# Patient Record
Sex: FEMALE | Employment: UNEMPLOYED | URBAN - METROPOLITAN AREA
[De-identification: names, ages, dates, MRNs, and addresses within clinical notes are randomized per-mention and may not be internally consistent; named-entity substitution may affect disease eponyms.]

---

## 2021-02-23 LAB
HEP B, EXTERNAL RESULT: NON REACTIVE
HEPATITIS C ANTIBODY, EXTERNAL RESULT: NON REACTIVE
HIV, EXTERNAL RESULT: NON REACTIVE
RPR, EXTERNAL RESULT: NON REACTIVE
RUBELLA TITER, EXTERNAL RESULT: NORMAL

## 2021-09-23 ENCOUNTER — HOSPITAL ENCOUNTER (OUTPATIENT)
Age: 21
Discharge: HOME | End: 2021-09-23
Attending: OBSTETRICS & GYNECOLOGY | Admitting: OBSTETRICS & GYNECOLOGY
Payer: COMMERCIAL

## 2021-09-23 VITALS
BODY MASS INDEX: 41.41 KG/M2 | TEMPERATURE: 98.7 F | HEIGHT: 62 IN | DIASTOLIC BLOOD PRESSURE: 78 MMHG | WEIGHT: 225 LBS | RESPIRATION RATE: 16 BRPM | SYSTOLIC BLOOD PRESSURE: 136 MMHG | HEART RATE: 86 BPM

## 2021-09-23 PROCEDURE — 99211 OFF/OP EST MAY X REQ PHY/QHP: CPT

## 2021-09-23 NOTE — H&P
Department of Obstetrics and Gynecology  Labor and Delivery Triage Note        CHIEF COMPLAINT:  Moved from [place] to [place] and has not established care. Presents to triage because \"today is my due date and I wanted to check on my baby\"      HISTORY OF PRESENT ILLNESS:      The patient is a 24 y.o. female 37w0d. OB History        1    Para        Term                AB        Living           SAB        TAB        Ectopic        Molar        Multiple        Live Births                  Patient presents with a chief complaint as above. Patient had prenatal care up to 22 weeks through [facility]. [place] by 1st trimester US (7 wk) = 2021    Estimated Due Date:  Estimated Date of Delivery: 21    PAST MEDICAL HISTORY:   Past Medical History:   Diagnosis Date    Mental disorder        PAST  SURGICAL HISTORY:   Past Surgical History:   Procedure Laterality Date    CHOLECYSTECTOMY      TONSILLECTOMY         SOCIAL HISTORY:     reports that she has quit smoking. She has never used smokeless tobacco. She reports previous alcohol use. She reports previous drug use. MEDICATIONS:    Prior to Admission medications    Not on File        PRENATAL CARE:    Complicated by: no prenatal care since 22 weeks. REVIEW OF SYSTEMS:     Pertinent items are noted in HPI. PHYSICAL EXAM:    Vital Signs:   Vitals:    21 1533   BP: 120/73   Pulse: 96   Resp: 16   Temp: 98.7 °F (37.1 °C)       Cat 1 tracing    Contraction frequency:  0 minutes    Membranes:  Intact    Cervix: closed/50/-2      ASSESSMENT? PLAN:    There is no problem list on file for this patient. 24 y.o. female 37w0d. OB History        1    Para        Term                AB        Living           SAB        TAB        Ectopic        Molar        Multiple        Live Births                1. FWB reassuring. 2.  Term pregnancy, no provider. Reviewed Labor warnings and kick counts reviewed.  If patient has decreased fetal movement, leakage of fluid, bleeding or contractions, recommend returning to triage for evaluation. Patient reports understanding she will be assigned to a provider upon arrival.    3. Post date counseling performed. Recommend delivery prior to 42 weeks. If patient has not gone into spontaneous labor by 9/1, return to hospital for evaluation, induction.      Amee Seth MD  9/23/2021

## 2021-09-23 NOTE — PROGRESS NOTES
Pt sent here by office for decreased fetal movement and episode of feeling like she was going to pass out from fast heart rate stated by patient.

## 2021-10-01 ENCOUNTER — HOSPITAL ENCOUNTER (INPATIENT)
Age: 21
LOS: 3 days | Discharge: HOME | DRG: 540 | End: 2021-10-04
Attending: STUDENT IN AN ORGANIZED HEALTH CARE EDUCATION/TRAINING PROGRAM | Admitting: STUDENT IN AN ORGANIZED HEALTH CARE EDUCATION/TRAINING PROGRAM
Payer: COMMERCIAL

## 2021-10-01 PROBLEM — Z34.90 INTRAUTERINE PREGNANCY: Status: ACTIVE | Noted: 2021-10-01

## 2021-10-01 LAB
ABO/RH: NORMAL
AMPHETAMINE SCREEN, URINE: ABNORMAL
ANTIBODY SCREEN: NORMAL
BARBITURATE SCREEN URINE: ABNORMAL
BASOPHILS ABSOLUTE: 0 K/UL (ref 0–0.2)
BASOPHILS RELATIVE PERCENT: 0.2 %
BENZODIAZEPINE SCREEN, URINE: ABNORMAL
BUPRENORPHINE URINE: ABNORMAL
CANNABINOID SCREEN URINE: POSITIVE
COCAINE METABOLITE SCREEN URINE: ABNORMAL
EOSINOPHILS ABSOLUTE: 0.1 K/UL (ref 0–0.6)
EOSINOPHILS RELATIVE PERCENT: 0.5 %
HCT VFR BLD CALC: 40.1 % (ref 36–48)
HEMOGLOBIN: 13.5 G/DL (ref 12–16)
LYMPHOCYTES ABSOLUTE: 2.7 K/UL (ref 1–5.1)
LYMPHOCYTES RELATIVE PERCENT: 19.5 %
Lab: ABNORMAL
MCH RBC QN AUTO: 28.3 PG (ref 26–34)
MCHC RBC AUTO-ENTMCNC: 33.6 G/DL (ref 31–36)
MCV RBC AUTO: 84.3 FL (ref 80–100)
METHADONE SCREEN, URINE: ABNORMAL
MONOCYTES ABSOLUTE: 0.8 K/UL (ref 0–1.3)
MONOCYTES RELATIVE PERCENT: 6.1 %
NEUTROPHILS ABSOLUTE: 10.2 K/UL (ref 1.7–7.7)
NEUTROPHILS RELATIVE PERCENT: 73.7 %
OPIATE SCREEN URINE: ABNORMAL
OXYCODONE URINE: ABNORMAL
PDW BLD-RTO: 15.1 % (ref 12.4–15.4)
PH UA: 6
PHENCYCLIDINE SCREEN URINE: ABNORMAL
PLATELET # BLD: 262 K/UL (ref 135–450)
PMV BLD AUTO: 9 FL (ref 5–10.5)
PROPOXYPHENE SCREEN: ABNORMAL
RBC # BLD: 4.76 M/UL (ref 4–5.2)
SARS-COV-2, NAAT: NOT DETECTED
WBC # BLD: 13.8 K/UL (ref 4–11)

## 2021-10-01 PROCEDURE — 1220000000 HC SEMI PRIVATE OB R&B

## 2021-10-01 PROCEDURE — 86780 TREPONEMA PALLIDUM: CPT

## 2021-10-01 PROCEDURE — 80307 DRUG TEST PRSMV CHEM ANLYZR: CPT

## 2021-10-01 PROCEDURE — 6360000002 HC RX W HCPCS

## 2021-10-01 PROCEDURE — 86900 BLOOD TYPING SEROLOGIC ABO: CPT

## 2021-10-01 PROCEDURE — 86850 RBC ANTIBODY SCREEN: CPT

## 2021-10-01 PROCEDURE — 6360000002 HC RX W HCPCS: Performed by: STUDENT IN AN ORGANIZED HEALTH CARE EDUCATION/TRAINING PROGRAM

## 2021-10-01 PROCEDURE — 86901 BLOOD TYPING SEROLOGIC RH(D): CPT

## 2021-10-01 PROCEDURE — 87635 SARS-COV-2 COVID-19 AMP PRB: CPT

## 2021-10-01 PROCEDURE — 2580000003 HC RX 258: Performed by: STUDENT IN AN ORGANIZED HEALTH CARE EDUCATION/TRAINING PROGRAM

## 2021-10-01 PROCEDURE — 85025 COMPLETE CBC W/AUTO DIFF WBC: CPT

## 2021-10-01 PROCEDURE — 2500000003 HC RX 250 WO HCPCS

## 2021-10-01 RX ORDER — SODIUM CHLORIDE 0.9 % (FLUSH) 0.9 %
5-40 SYRINGE (ML) INJECTION PRN
Status: DISCONTINUED | OUTPATIENT
Start: 2021-10-01 | End: 2021-10-02

## 2021-10-01 RX ORDER — ONDANSETRON 2 MG/ML
4 INJECTION INTRAMUSCULAR; INTRAVENOUS EVERY 6 HOURS PRN
Status: DISCONTINUED | OUTPATIENT
Start: 2021-10-01 | End: 2021-10-02

## 2021-10-01 RX ORDER — PROMETHAZINE HYDROCHLORIDE 25 MG/ML
INJECTION, SOLUTION INTRAMUSCULAR; INTRAVENOUS
Status: COMPLETED
Start: 2021-10-01 | End: 2021-10-01

## 2021-10-01 RX ORDER — BUTORPHANOL TARTRATE 1 MG/ML
1 INJECTION, SOLUTION INTRAMUSCULAR; INTRAVENOUS
Status: DISCONTINUED | OUTPATIENT
Start: 2021-10-01 | End: 2021-10-02

## 2021-10-01 RX ORDER — DOCUSATE SODIUM 100 MG/1
100 CAPSULE, LIQUID FILLED ORAL 2 TIMES DAILY
Status: DISCONTINUED | OUTPATIENT
Start: 2021-10-01 | End: 2021-10-02

## 2021-10-01 RX ORDER — TERBUTALINE SULFATE 1 MG/ML
0.25 INJECTION, SOLUTION SUBCUTANEOUS ONCE
Status: COMPLETED | OUTPATIENT
Start: 2021-10-01 | End: 2021-10-02

## 2021-10-01 RX ORDER — PROMETHAZINE HYDROCHLORIDE 25 MG/ML
12.5 INJECTION, SOLUTION INTRAMUSCULAR; INTRAVENOUS EVERY 6 HOURS PRN
Status: DISCONTINUED | OUTPATIENT
Start: 2021-10-01 | End: 2021-10-01

## 2021-10-01 RX ORDER — SODIUM CHLORIDE 0.9 % (FLUSH) 0.9 %
5-40 SYRINGE (ML) INJECTION EVERY 12 HOURS SCHEDULED
Status: DISCONTINUED | OUTPATIENT
Start: 2021-10-01 | End: 2021-10-02

## 2021-10-01 RX ORDER — SODIUM CHLORIDE, SODIUM LACTATE, POTASSIUM CHLORIDE, CALCIUM CHLORIDE 600; 310; 30; 20 MG/100ML; MG/100ML; MG/100ML; MG/100ML
INJECTION, SOLUTION INTRAVENOUS CONTINUOUS
Status: DISCONTINUED | OUTPATIENT
Start: 2021-10-01 | End: 2021-10-02

## 2021-10-01 RX ORDER — SODIUM CHLORIDE, SODIUM LACTATE, POTASSIUM CHLORIDE, AND CALCIUM CHLORIDE .6; .31; .03; .02 G/100ML; G/100ML; G/100ML; G/100ML
1000 INJECTION, SOLUTION INTRAVENOUS PRN
Status: DISCONTINUED | OUTPATIENT
Start: 2021-10-01 | End: 2021-10-02

## 2021-10-01 RX ORDER — SODIUM CHLORIDE, SODIUM LACTATE, POTASSIUM CHLORIDE, AND CALCIUM CHLORIDE .6; .31; .03; .02 G/100ML; G/100ML; G/100ML; G/100ML
500 INJECTION, SOLUTION INTRAVENOUS PRN
Status: DISCONTINUED | OUTPATIENT
Start: 2021-10-01 | End: 2021-10-02

## 2021-10-01 RX ORDER — PROMETHAZINE HYDROCHLORIDE 25 MG/ML
12.5 INJECTION, SOLUTION INTRAMUSCULAR; INTRAVENOUS EVERY 6 HOURS PRN
Status: DISCONTINUED | OUTPATIENT
Start: 2021-10-01 | End: 2021-10-02

## 2021-10-01 RX ORDER — SODIUM CHLORIDE 9 MG/ML
25 INJECTION, SOLUTION INTRAVENOUS PRN
Status: DISCONTINUED | OUTPATIENT
Start: 2021-10-01 | End: 2021-10-02

## 2021-10-01 RX ADMIN — PROMETHAZINE HYDROCHLORIDE 12.5 MG: 25 INJECTION INTRAMUSCULAR; INTRAVENOUS at 19:52

## 2021-10-01 RX ADMIN — SODIUM CHLORIDE, POTASSIUM CHLORIDE, SODIUM LACTATE AND CALCIUM CHLORIDE: 600; 310; 30; 20 INJECTION, SOLUTION INTRAVENOUS at 16:43

## 2021-10-01 RX ADMIN — Medication 1 MILLI-UNITS/MIN: at 20:10

## 2021-10-01 RX ADMIN — DEXTROSE MONOHYDRATE 5 MILLION UNITS: 5 INJECTION INTRAVENOUS at 16:45

## 2021-10-01 RX ADMIN — FAMOTIDINE 20 MG: 10 INJECTION, SOLUTION INTRAVENOUS at 19:50

## 2021-10-01 RX ADMIN — Medication 2.5 MILLION UNITS: at 21:25

## 2021-10-01 RX ADMIN — BUTORPHANOL TARTRATE 1 MG: 1 INJECTION, SOLUTION INTRAMUSCULAR; INTRAVENOUS at 22:01

## 2021-10-01 ASSESSMENT — PAIN DESCRIPTION - DESCRIPTORS
DESCRIPTORS: CRAMPING

## 2021-10-01 ASSESSMENT — PAIN SCALES - GENERAL: PAINLEVEL_OUTOF10: 9

## 2021-10-01 NOTE — PROGRESS NOTES
Pt arrived to triage with c/o contractions and that when she was here back on 9/23, the physician told her that if she was not delivered by 10/1 to come to triage for induction. Pt has not had any prenatal care since 22 weeks due to moving from [place] and unable to get her records transferred. Pt states baby has been active, denies vaginal bleeding or gushes of fluid.

## 2021-10-01 NOTE — H&P
Department of Obstetrics and Gynecology  Obstetrics History and Physical    CHIEF COMPLAINT:  For IOL for postdates    HISTORY OF PRESENT ILLNESS:      The patient is a 24 y.o.  at 44w9d [facility] who presents for IOL due to postdates. Reports having some pain and cramping. Reports active fetal movement.  Denies vaginal bleeding, LOF, or abnormal vaginal discharge    Estimated Due Date: Estimated Date of Delivery: 21    PRENATAL CARE:    Complicated by: none    PAST OB HISTORY:  OB History        1    Para        Term                AB        Living           SAB        TAB        Ectopic        Molar        Multiple        Live Births                  Past Medical History:        Diagnosis Date    ADHD     Anxiety     Bipolar 1 disorder ([facility] [address]     Depression     Fibromyalgia     Mental disorder     Migraines      Past Surgical History:        Procedure Laterality Date    CHOLECYSTECTOMY      TONSILLECTOMY       Allergies:  Latex, Adhesive tape, and Amitriptyline    Social History:    Social History     Socioeconomic History    Marital status:      Spouse name: Not on file    Number of children: Not on file    Years of education: Not on file    Highest education level: Not on file   Occupational History    Not on file   Tobacco Use    Smoking status: Former Smoker    Smokeless tobacco: Never Used   Vaping Use    Vaping Use: Never used   Substance and Sexual Activity    Alcohol use: Not Currently    Drug use: Yes     Frequency: 7.0 times per week     Types: Marijuana    Sexual activity: Yes     Partners: Male   Other Topics Concern    Not on file   Social History Narrative    Not on file     Social Determinants of Health     Financial Resource Strain:     Difficulty of Paying Living Expenses:    Food Insecurity:     Worried About Running Out of Food in the Last Year:     [address] in the Last Year:    Transportation Needs:     Lack of Transportation (Medical):  Lack of Transportation (Non-Medical):    Physical Activity:     Days of Exercise per Week:     Minutes of Exercise per Session:    Stress:     Feeling of Stress :    Social Connections:     Frequency of Communication with Friends and Family:     Frequency of Social Gatherings with Friends and Family:     Attends [employer] Services:     Active Member of Clubs or Organizations:     Attends Club or Organization Meetings:     Marital Status:    Intimate Partner Violence:     Fear of Current or Ex-Partner:     Emotionally Abused:     Physically Abused:     Sexually Abused:      Family History:       Problem Relation Age of Onset    Depression Mother     Arthritis Maternal Grandmother     Depression Maternal Grandmother     Diabetes Maternal Grandfather     High Blood Pressure Maternal Grandfather     Diabetes Paternal Grandmother     Stroke Paternal Grandmother      Medications Prior to Admission:  No medications prior to admission. REVIEW OF SYSTEMS:    Denies fever, chills, dizziness, CP, SOB, N/V/D, constipation    PHYSICAL EXAM:  Vitals:    10/01/21 1313 10/01/21 1320   BP: 127/86    Pulse: 82    Resp: 18    Temp: 99.8 °F (37.7 °C)    Weight:  230 lb (104.3 kg)   Height:  5' 2\" (1.575 m)     General appearance:  awake, alert, cooperative, no apparent distress, and appears stated age  Neurologic:  Awake, alert, oriented to name, place and time. Lungs:  No increased work of breathing, good air exchange  Abdomen:  Soft, non tender, gravid, consistent with her gestational age, EFW by Leopold's maneuver was 3800g  Fetal heart rate:  Reassuring. Pelvis:  Adequate pelvis  Cervix: 1/70/-2, vertex per RN  Contraction frequency:  q 2 minutes    Membranes:  Intact    Labs:    Prenatal labs (2/23/2021):  HIV-/HBV-/HCV-/ RI/Trep Ab negative    ASSESSMENT AND PLAN:    Labor: Admit, anticipate normal delivery, routine labor orders  Fetus: Reassuring  GBS: unknown  Other: IV hydration and antiemetics, pitocin    Yina Tolbert MD

## 2021-10-02 ENCOUNTER — ANESTHESIA EVENT (OUTPATIENT)
Dept: LABOR AND DELIVERY | Age: 21
DRG: 540 | End: 2021-10-02
Payer: COMMERCIAL

## 2021-10-02 ENCOUNTER — ANESTHESIA (OUTPATIENT)
Dept: LABOR AND DELIVERY | Age: 21
DRG: 540 | End: 2021-10-02
Payer: COMMERCIAL

## 2021-10-02 VITALS
OXYGEN SATURATION: 100 % | RESPIRATION RATE: 2 BRPM | DIASTOLIC BLOOD PRESSURE: 59 MMHG | SYSTOLIC BLOOD PRESSURE: 112 MMHG | TEMPERATURE: 98.6 F

## 2021-10-02 LAB — TOTAL SYPHILLIS IGG/IGM: NORMAL

## 2021-10-02 PROCEDURE — 6360000002 HC RX W HCPCS: Performed by: NURSE ANESTHETIST, CERTIFIED REGISTERED

## 2021-10-02 PROCEDURE — 3700000001 HC ADD 15 MINUTES (ANESTHESIA): Performed by: OBSTETRICS & GYNECOLOGY

## 2021-10-02 PROCEDURE — 2580000003 HC RX 258: Performed by: STUDENT IN AN ORGANIZED HEALTH CARE EDUCATION/TRAINING PROGRAM

## 2021-10-02 PROCEDURE — 2580000003 HC RX 258: Performed by: OBSTETRICS & GYNECOLOGY

## 2021-10-02 PROCEDURE — 2709999900 HC NON-CHARGEABLE SUPPLY: Performed by: OBSTETRICS & GYNECOLOGY

## 2021-10-02 PROCEDURE — 1220000000 HC SEMI PRIVATE OB R&B

## 2021-10-02 PROCEDURE — 6360000002 HC RX W HCPCS: Performed by: OBSTETRICS & GYNECOLOGY

## 2021-10-02 PROCEDURE — 7100000001 HC PACU RECOVERY - ADDTL 15 MIN: Performed by: OBSTETRICS & GYNECOLOGY

## 2021-10-02 PROCEDURE — 3700000000 HC ANESTHESIA ATTENDED CARE: Performed by: OBSTETRICS & GYNECOLOGY

## 2021-10-02 PROCEDURE — 2500000003 HC RX 250 WO HCPCS: Performed by: NURSE ANESTHETIST, CERTIFIED REGISTERED

## 2021-10-02 PROCEDURE — 7100000000 HC PACU RECOVERY - FIRST 15 MIN: Performed by: OBSTETRICS & GYNECOLOGY

## 2021-10-02 PROCEDURE — 6370000000 HC RX 637 (ALT 250 FOR IP): Performed by: OBSTETRICS & GYNECOLOGY

## 2021-10-02 PROCEDURE — 3E033VJ INTRODUCTION OF OTHER HORMONE INTO PERIPHERAL VEIN, PERCUTANEOUS APPROACH: ICD-10-PCS | Performed by: OBSTETRICS & GYNECOLOGY

## 2021-10-02 PROCEDURE — 3609079900 HC CESAREAN SECTION: Performed by: OBSTETRICS & GYNECOLOGY

## 2021-10-02 PROCEDURE — 6360000002 HC RX W HCPCS: Performed by: STUDENT IN AN ORGANIZED HEALTH CARE EDUCATION/TRAINING PROGRAM

## 2021-10-02 PROCEDURE — 3700000025 EPIDURAL BLOCK: Performed by: ANESTHESIOLOGY

## 2021-10-02 PROCEDURE — 2500000003 HC RX 250 WO HCPCS: Performed by: STUDENT IN AN ORGANIZED HEALTH CARE EDUCATION/TRAINING PROGRAM

## 2021-10-02 RX ORDER — SODIUM CHLORIDE 0.9 % (FLUSH) 0.9 %
10 SYRINGE (ML) INJECTION PRN
Status: DISCONTINUED | OUTPATIENT
Start: 2021-10-02 | End: 2021-10-02

## 2021-10-02 RX ORDER — TERBUTALINE SULFATE 1 MG/ML
0.25 INJECTION, SOLUTION SUBCUTANEOUS ONCE
Status: DISCONTINUED | OUTPATIENT
Start: 2021-10-02 | End: 2021-10-02

## 2021-10-02 RX ORDER — SODIUM CHLORIDE 9 MG/ML
25 INJECTION, SOLUTION INTRAVENOUS PRN
Status: DISCONTINUED | OUTPATIENT
Start: 2021-10-02 | End: 2021-10-02

## 2021-10-02 RX ORDER — ONDANSETRON 2 MG/ML
4 INJECTION INTRAMUSCULAR; INTRAVENOUS EVERY 6 HOURS PRN
Status: DISCONTINUED | OUTPATIENT
Start: 2021-10-02 | End: 2021-10-04 | Stop reason: HOSPADM

## 2021-10-02 RX ORDER — IBUPROFEN 800 MG/1
800 TABLET ORAL EVERY 8 HOURS SCHEDULED
Status: DISCONTINUED | OUTPATIENT
Start: 2021-10-02 | End: 2021-10-04 | Stop reason: HOSPADM

## 2021-10-02 RX ORDER — OXYCODONE HYDROCHLORIDE 5 MG/1
10 TABLET ORAL EVERY 4 HOURS PRN
Status: DISCONTINUED | OUTPATIENT
Start: 2021-10-02 | End: 2021-10-04 | Stop reason: HOSPADM

## 2021-10-02 RX ORDER — BUPIVACAINE HYDROCHLORIDE 2.5 MG/ML
INJECTION, SOLUTION EPIDURAL; INFILTRATION; INTRACAUDAL PRN
Status: DISCONTINUED | OUTPATIENT
Start: 2021-10-02 | End: 2021-10-02 | Stop reason: SDUPTHER

## 2021-10-02 RX ORDER — ACETAMINOPHEN 500 MG
1000 TABLET ORAL EVERY 8 HOURS SCHEDULED
Status: DISCONTINUED | OUTPATIENT
Start: 2021-10-02 | End: 2021-10-04 | Stop reason: HOSPADM

## 2021-10-02 RX ORDER — DOCUSATE SODIUM 100 MG/1
100 CAPSULE, LIQUID FILLED ORAL 2 TIMES DAILY PRN
Status: DISCONTINUED | OUTPATIENT
Start: 2021-10-02 | End: 2021-10-04 | Stop reason: HOSPADM

## 2021-10-02 RX ORDER — SODIUM CHLORIDE 0.9 % (FLUSH) 0.9 %
5-40 SYRINGE (ML) INJECTION EVERY 12 HOURS SCHEDULED
Status: DISCONTINUED | OUTPATIENT
Start: 2021-10-02 | End: 2021-10-04 | Stop reason: HOSPADM

## 2021-10-02 RX ORDER — PHENYLEPHRINE HCL IN 0.9% NACL 1 MG/10 ML
SYRINGE (ML) INTRAVENOUS PRN
Status: DISCONTINUED | OUTPATIENT
Start: 2021-10-02 | End: 2021-10-02 | Stop reason: SDUPTHER

## 2021-10-02 RX ORDER — KETOROLAC TROMETHAMINE 30 MG/ML
30 INJECTION, SOLUTION INTRAMUSCULAR; INTRAVENOUS EVERY 8 HOURS
Status: DISCONTINUED | OUTPATIENT
Start: 2021-10-02 | End: 2021-10-04 | Stop reason: HOSPADM

## 2021-10-02 RX ORDER — SODIUM CHLORIDE 0.9 % (FLUSH) 0.9 %
5-40 SYRINGE (ML) INJECTION PRN
Status: DISCONTINUED | OUTPATIENT
Start: 2021-10-02 | End: 2021-10-04 | Stop reason: HOSPADM

## 2021-10-02 RX ORDER — METOCLOPRAMIDE HYDROCHLORIDE 5 MG/ML
INJECTION INTRAMUSCULAR; INTRAVENOUS
Status: COMPLETED
Start: 2021-10-02 | End: 2021-10-02

## 2021-10-02 RX ORDER — DIPHENHYDRAMINE HYDROCHLORIDE 50 MG/ML
25 INJECTION INTRAMUSCULAR; INTRAVENOUS EVERY 6 HOURS PRN
Status: DISCONTINUED | OUTPATIENT
Start: 2021-10-02 | End: 2021-10-04 | Stop reason: HOSPADM

## 2021-10-02 RX ORDER — SODIUM CHLORIDE, SODIUM LACTATE, POTASSIUM CHLORIDE, CALCIUM CHLORIDE 600; 310; 30; 20 MG/100ML; MG/100ML; MG/100ML; MG/100ML
INJECTION, SOLUTION INTRAVENOUS CONTINUOUS
Status: DISCONTINUED | OUTPATIENT
Start: 2021-10-02 | End: 2021-10-02

## 2021-10-02 RX ORDER — KETOROLAC TROMETHAMINE 30 MG/ML
INJECTION, SOLUTION INTRAMUSCULAR; INTRAVENOUS PRN
Status: DISCONTINUED | OUTPATIENT
Start: 2021-10-02 | End: 2021-10-02 | Stop reason: SDUPTHER

## 2021-10-02 RX ORDER — SIMETHICONE 80 MG
80 TABLET,CHEWABLE ORAL EVERY 6 HOURS PRN
Status: DISCONTINUED | OUTPATIENT
Start: 2021-10-02 | End: 2021-10-04 | Stop reason: HOSPADM

## 2021-10-02 RX ORDER — SODIUM CHLORIDE, SODIUM LACTATE, POTASSIUM CHLORIDE, AND CALCIUM CHLORIDE .6; .31; .03; .02 G/100ML; G/100ML; G/100ML; G/100ML
1000 INJECTION, SOLUTION INTRAVENOUS ONCE
Status: DISCONTINUED | OUTPATIENT
Start: 2021-10-02 | End: 2021-10-02

## 2021-10-02 RX ORDER — LIDOCAINE HYDROCHLORIDE AND EPINEPHRINE BITARTRATE 20; .01 MG/ML; MG/ML
INJECTION, SOLUTION SUBCUTANEOUS PRN
Status: DISCONTINUED | OUTPATIENT
Start: 2021-10-02 | End: 2021-10-02 | Stop reason: SDUPTHER

## 2021-10-02 RX ORDER — AZITHROMYCIN 500 MG/1
INJECTION, POWDER, LYOPHILIZED, FOR SOLUTION INTRAVENOUS
Status: DISCONTINUED
Start: 2021-10-02 | End: 2021-10-02

## 2021-10-02 RX ORDER — METOCLOPRAMIDE HYDROCHLORIDE 5 MG/ML
INJECTION INTRAMUSCULAR; INTRAVENOUS PRN
Status: DISCONTINUED | OUTPATIENT
Start: 2021-10-02 | End: 2021-10-02 | Stop reason: SDUPTHER

## 2021-10-02 RX ORDER — OXYCODONE HYDROCHLORIDE 5 MG/1
5 TABLET ORAL EVERY 4 HOURS PRN
Status: DISCONTINUED | OUTPATIENT
Start: 2021-10-02 | End: 2021-10-04 | Stop reason: HOSPADM

## 2021-10-02 RX ORDER — FERROUS SULFATE 325(65) MG
325 TABLET ORAL 2 TIMES DAILY WITH MEALS
Status: DISCONTINUED | OUTPATIENT
Start: 2021-10-02 | End: 2021-10-04 | Stop reason: HOSPADM

## 2021-10-02 RX ORDER — MORPHINE SULFATE 1 MG/ML
INJECTION, SOLUTION EPIDURAL; INTRATHECAL; INTRAVENOUS
Status: DISCONTINUED
Start: 2021-10-02 | End: 2021-10-02

## 2021-10-02 RX ORDER — KETOROLAC TROMETHAMINE 30 MG/ML
INJECTION, SOLUTION INTRAMUSCULAR; INTRAVENOUS
Status: COMPLETED
Start: 2021-10-02 | End: 2021-10-02

## 2021-10-02 RX ORDER — LANOLIN 100 %
OINTMENT (GRAM) TOPICAL
Status: DISCONTINUED | OUTPATIENT
Start: 2021-10-02 | End: 2021-10-04 | Stop reason: HOSPADM

## 2021-10-02 RX ORDER — MORPHINE SULFATE 10 MG/ML
INJECTION, SOLUTION INTRAMUSCULAR; INTRAVENOUS PRN
Status: DISCONTINUED | OUTPATIENT
Start: 2021-10-02 | End: 2021-10-02 | Stop reason: SDUPTHER

## 2021-10-02 RX ORDER — SODIUM CHLORIDE 0.9 % (FLUSH) 0.9 %
10 SYRINGE (ML) INJECTION EVERY 12 HOURS SCHEDULED
Status: DISCONTINUED | OUTPATIENT
Start: 2021-10-02 | End: 2021-10-02

## 2021-10-02 RX ORDER — PRENATAL WITH FERROUS FUM AND FOLIC ACID 3080; 920; 120; 400; 22; 1.84; 3; 20; 10; 1; 12; 200; 27; 25; 2 [IU]/1; [IU]/1; MG/1; [IU]/1; MG/1; MG/1; MG/1; MG/1; MG/1; MG/1; UG/1; MG/1; MG/1; MG/1; MG/1
1 TABLET ORAL DAILY
Status: DISCONTINUED | OUTPATIENT
Start: 2021-10-02 | End: 2021-10-04 | Stop reason: HOSPADM

## 2021-10-02 RX ORDER — ONDANSETRON 2 MG/ML
4 INJECTION INTRAMUSCULAR; INTRAVENOUS EVERY 6 HOURS PRN
Status: DISCONTINUED | OUTPATIENT
Start: 2021-10-02 | End: 2021-10-02

## 2021-10-02 RX ORDER — SODIUM CHLORIDE 9 MG/ML
25 INJECTION, SOLUTION INTRAVENOUS PRN
Status: DISCONTINUED | OUTPATIENT
Start: 2021-10-02 | End: 2021-10-04 | Stop reason: HOSPADM

## 2021-10-02 RX ORDER — OXYTOCIN 10 [USP'U]/ML
INJECTION, SOLUTION INTRAMUSCULAR; INTRAVENOUS PRN
Status: DISCONTINUED | OUTPATIENT
Start: 2021-10-02 | End: 2021-10-02 | Stop reason: SDUPTHER

## 2021-10-02 RX ORDER — SODIUM CHLORIDE, SODIUM LACTATE, POTASSIUM CHLORIDE, CALCIUM CHLORIDE 600; 310; 30; 20 MG/100ML; MG/100ML; MG/100ML; MG/100ML
INJECTION, SOLUTION INTRAVENOUS CONTINUOUS
Status: DISCONTINUED | OUTPATIENT
Start: 2021-10-02 | End: 2021-10-04 | Stop reason: HOSPADM

## 2021-10-02 RX ADMIN — PROMETHAZINE HYDROCHLORIDE 12.5 MG: 25 INJECTION INTRAMUSCULAR; INTRAVENOUS at 04:38

## 2021-10-02 RX ADMIN — KETOROLAC TROMETHAMINE 30 MG: 30 INJECTION, SOLUTION INTRAMUSCULAR at 18:15

## 2021-10-02 RX ADMIN — ACETAMINOPHEN 1000 MG: 500 TABLET ORAL at 16:24

## 2021-10-02 RX ADMIN — BUPIVACAINE HYDROCHLORIDE 6 ML: 2.5 INJECTION, SOLUTION EPIDURAL; INFILTRATION; INTRACAUDAL; PERINEURAL at 00:41

## 2021-10-02 RX ADMIN — SODIUM CHLORIDE, SODIUM LACTATE, POTASSIUM CHLORIDE, AND CALCIUM CHLORIDE: .6; .31; .03; .02 INJECTION, SOLUTION INTRAVENOUS at 09:32

## 2021-10-02 RX ADMIN — ONDANSETRON 4 MG: 2 INJECTION INTRAMUSCULAR; INTRAVENOUS at 09:34

## 2021-10-02 RX ADMIN — Medication 10 ML: at 16:25

## 2021-10-02 RX ADMIN — DIPHENHYDRAMINE HYDROCHLORIDE 25 MG: 50 INJECTION, SOLUTION INTRAMUSCULAR; INTRAVENOUS at 16:25

## 2021-10-02 RX ADMIN — KETOROLAC TROMETHAMINE 30 MG: 30 INJECTION, SOLUTION INTRAMUSCULAR; INTRAVENOUS at 09:58

## 2021-10-02 RX ADMIN — FAMOTIDINE 20 MG: 10 INJECTION, SOLUTION INTRAVENOUS at 09:25

## 2021-10-02 RX ADMIN — SODIUM CHLORIDE, POTASSIUM CHLORIDE, SODIUM LACTATE AND CALCIUM CHLORIDE 1000 ML: 600; 310; 30; 20 INJECTION, SOLUTION INTRAVENOUS at 00:00

## 2021-10-02 RX ADMIN — Medication 15 ML/HR: at 00:46

## 2021-10-02 RX ADMIN — OXYTOCIN 10 UNITS: 10 INJECTION INTRAVENOUS at 09:55

## 2021-10-02 RX ADMIN — FAMOTIDINE 20 MG: 10 INJECTION, SOLUTION INTRAVENOUS at 04:39

## 2021-10-02 RX ADMIN — TERBUTALINE SULFATE 0.25 MG: 1 INJECTION SUBCUTANEOUS at 06:50

## 2021-10-02 RX ADMIN — ONDANSETRON 4 MG: 2 INJECTION INTRAMUSCULAR; INTRAVENOUS at 04:38

## 2021-10-02 RX ADMIN — MORPHINE SULFATE 3 MG: 10 INJECTION, SOLUTION INTRAMUSCULAR; INTRAVENOUS at 10:23

## 2021-10-02 RX ADMIN — OXYTOCIN 20 UNITS: 10 INJECTION INTRAVENOUS at 10:21

## 2021-10-02 RX ADMIN — Medication 2.5 MILLION UNITS: at 02:53

## 2021-10-02 RX ADMIN — LIDOCAINE HYDROCHLORIDE AND EPINEPHRINE 10 ML: 20; 10 INJECTION, SOLUTION INFILTRATION; PERINEURAL at 09:25

## 2021-10-02 RX ADMIN — SODIUM CHLORIDE, POTASSIUM CHLORIDE, SODIUM LACTATE AND CALCIUM CHLORIDE 500 ML: 600; 310; 30; 20 INJECTION, SOLUTION INTRAVENOUS at 01:00

## 2021-10-02 RX ADMIN — SODIUM CHLORIDE, POTASSIUM CHLORIDE, SODIUM LACTATE AND CALCIUM CHLORIDE: 600; 310; 30; 20 INJECTION, SOLUTION INTRAVENOUS at 01:35

## 2021-10-02 RX ADMIN — Medication 100 MCG: at 01:06

## 2021-10-02 RX ADMIN — Medication 2.5 MILLION UNITS: at 07:27

## 2021-10-02 RX ADMIN — SODIUM CHLORIDE, POTASSIUM CHLORIDE, SODIUM LACTATE AND CALCIUM CHLORIDE: 600; 310; 30; 20 INJECTION, SOLUTION INTRAVENOUS at 11:33

## 2021-10-02 RX ADMIN — CEFAZOLIN 2 G: 10 INJECTION, POWDER, FOR SOLUTION INTRAVENOUS at 09:30

## 2021-10-02 RX ADMIN — SODIUM CHLORIDE, SODIUM LACTATE, POTASSIUM CHLORIDE, AND CALCIUM CHLORIDE: .6; .31; .03; .02 INJECTION, SOLUTION INTRAVENOUS at 10:21

## 2021-10-02 RX ADMIN — AZITHROMYCIN MONOHYDRATE 500 MG: 500 INJECTION, POWDER, LYOPHILIZED, FOR SOLUTION INTRAVENOUS at 09:42

## 2021-10-02 RX ADMIN — METOCLOPRAMIDE HYDROCHLORIDE 10 MG: 5 INJECTION INTRAMUSCULAR; INTRAVENOUS at 09:25

## 2021-10-02 ASSESSMENT — LIFESTYLE VARIABLES: SMOKING_STATUS: 1

## 2021-10-02 ASSESSMENT — PULMONARY FUNCTION TESTS
PIF_VALUE: 0
PIF_VALUE: 1
PIF_VALUE: 0
PIF_VALUE: 1
PIF_VALUE: 0
PIF_VALUE: 1
PIF_VALUE: 0
PIF_VALUE: 1
PIF_VALUE: 0
PIF_VALUE: 1

## 2021-10-02 ASSESSMENT — PAIN SCALES - GENERAL
PAINLEVEL_OUTOF10: 3
PAINLEVEL_OUTOF10: 4

## 2021-10-02 NOTE — PROGRESS NOTES
Department of Obstetrics and Gynecology  Labor and Delivery   Progress Note      SUBJECTIVE:  Patient reports having nausea and she is actively vomitting    OBJECTIVE:      Fetal heart rate:        Baseline Heart Rate:  120       Accelerations:  present       Long Term Variability:  moderate       Decelerations:  Multiple spontaneous decelerations, one prolonged decel to 70 at 0429 for 1.5 minutes, good recovery    Contraction frequency: 2-3 minutes    Membranes:  S/p SROM with light mec at 0219    Cervix: via RN         Dilation:  6 cm         Effacement:  90%         Station:  -1      Pit 1        ASSESSMENT & PLAN:  25 yo  at 41w5d who presents for IOL    - Category II FHT with concerning prolonged deceleration with absent variability, patient was counseled that if baby does not recover, a  will be indicated, patient verbalized understanding, baby recovered well soon after this conversation when patient stopped vomitting and labor was allowed to continue  - pitocin was turned off, patient was placed in left lateral decubitus, fluids are running  - s/p IUPC and FSE   - phenergan and zofran for nausea/vomiting  - expectant management    Carter Best MD

## 2021-10-02 NOTE — OP NOTE
Operative Note      Patient: Lefty Capone  YOB: 2000  MRN: [id number]    Date of Procedure: 10/2/2021    Pre-Op Diagnosis:  at [facility] [address] failure to progress and NRFHT     Scant prenatal care     Post-Op Diagnosis: Same       Procedure(s):  Primary low transverse  SECTION    Surgeon(s):  Lisandro Mason MD    Assistant:   Surgical Assistant: Perfecto Sandra    Anesthesia: epidural    Estimated Blood Loss (mL): 832HP    Complications: None    Specimens:   * No specimens in log *    Implants:  * No implants in log *      Drains:   Urethral Catheter (Active)       Findings: liveborn male infant cephalic presentation, apgars 7/8 , placenta 3VC, intact.  Normal fallopian tubes and ovaries     Dictation #: [id number]    Electronically signed by Lisandro Mason MD on 10/2/2021 at 11:04 AM

## 2021-10-02 NOTE — ANESTHESIA PRE PROCEDURE
Department of Anesthesiology  Preprocedure Note       Name:  Jean Paul Miguel   Age:  24 y.o.  :  2000                                          MRN:  [id number]         Date:  10/2/2021      Surgeon: * No surgeons listed *    Procedure: * No procedures listed *    Medications prior to admission:   Prior to Admission medications    Not on File       Current medications:    Current Facility-Administered Medications   Medication Dose Route Frequency Provider Last Rate Last Admin    lactated ringers infusion   IntraVENous Continuous Pam Payan  mL/hr at 10/01/21 1643 New Bag at 10/01/21 1643    lactated ringers bolus  500 mL IntraVENous PRN Pam Payan MD        Or    lactated ringers bolus  1,000 mL IntraVENous PRN Pam Payan MD        sodium chloride flush 0.9 % injection 5-40 mL  5-40 mL IntraVENous 2 times per day Pam Payan MD        sodium chloride flush 0.9 % injection 5-40 mL  5-40 mL IntraVENous PRN Pam Payan MD        0.9 % sodium chloride infusion  25 mL IntraVENous PRN Pam Payan MD        oxytocin (PITOCIN) 30 units in 500 mL infusion  87.3 amalia-units/min IntraVENous Continuous PRN Pam Payan MD        And    oxytocin (PITOCIN) 10 unit bolus from the bag  10 Units IntraVENous PRN Pam Payan MD        ondansetron (ZOFRAN) injection 4 mg  4 mg IntraVENous Q6H PRN Pam Payan MD        docusate sodium (COLACE) capsule 100 mg  100 mg Oral BID Pam Payan MD        butorphanol (STADOL) injection 1 mg  1 mg IntraVENous Q3H PRN Pam Payan MD   1 mg at 10/01/21 2201    oxytocin (PITOCIN) 30 units in 500 mL infusion  1-20 amalia-units/min IntraVENous Continuous Pam Payan MD 2 mL/hr at 10/01/21 2330 2 amalia-units/min at 10/01/21 2330    terbutaline (BRETHINE) injection 0.25 mg  0.25 mg SubCUTAneous Once Pam Payan MD        penicillin G potassium in d5w IVPB 2.5 Million Units  2.5 Million Units IntraVENous Q4H Pam Payan  mL/hr at 10/01/21 2125 2.5 Million Units at 10/01/21 2125    ondansetron (ZOFRAN) injection 4 mg  4 mg IntraVENous Q6H PRN Pam Payan MD        famotidine (PEPCID) injection 20 mg  20 mg IntraVENous BID PRN Pam Payan MD   20 mg at 10/01/21 1950    promethazine (PHENERGAN) injection 12.5 mg  12.5 mg IntraMUSCular Q6H PRN Pam Payan MD   12.5 mg at 10/01/21 1952       Allergies: Allergies   Allergen Reactions    Latex     Adhesive Tape     Amitriptyline        Problem List:    Patient Active Problem List   Diagnosis Code    Intrauterine pregnancy Z34.90       Past Medical History:        Diagnosis Date    ADHD     Anxiety     Bipolar 1 disorder ([facility] [address]     Depression     Fibromyalgia     Mental disorder     Migraines        Past Surgical History:        Procedure Laterality Date    CHOLECYSTECTOMY      TONSILLECTOMY         Social History:    Social History     Tobacco Use    Smoking status: Former Smoker    Smokeless tobacco: Never Used   Substance Use Topics    Alcohol use: Not Currently                                Counseling given: Not Answered      Vital Signs (Current):   Vitals:    10/01/21 2005 10/01/21 2100 10/01/21 2153 10/01/21 2305   BP: (!) 140/77 131/78 123/74 130/73   Pulse: 98 98 94 82   Resp:  16 16 18   Temp: 37.2 °C (99 °F)      Weight:       Height:                                                  BP Readings from Last 3 Encounters:   10/01/21 130/73   09/23/21 136/78       NPO Status: Time of last liquid consumption: 1300                        Time of last solid consumption: 1200                        Date of last liquid consumption: 10/01/21                        Date of last solid food consumption: 10/01/21    BMI:   Wt Readings from Last 3 Encounters:   10/01/21 230 lb (104.3 kg)   09/23/21 225 lb (102.1 kg)     Body mass index is 42.07 kg/m².     CBC:   Lab Results   Component Value Date    WBC 13.8 10/01/2021    RBC 4.76 10/01/2021    HGB 13.5 10/01/2021    HCT 40.1 10/01/2021 MCV 84.3 10/01/2021    RDW 15.1 10/01/2021     10/01/2021       CMP: No results found for: NA, K, CL, CO2, BUN, CREATININE, GFRAA, AGRATIO, LABGLOM, GLUCOSE, PROT, CALCIUM, BILITOT, ALKPHOS, AST, ALT    POC Tests: No results for input(s): POCGLU, POCNA, POCK, POCCL, POCBUN, POCHEMO, POCHCT in the last 72 hours. Coags: No results found for: PROTIME, INR, APTT    HCG (If Applicable): No results found for: PREGTESTUR, PREGSERUM, HCG, HCGQUANT     ABGs: No results found for: PHART, PO2ART, AQR1GOL, SCH9SCM, BEART, M4KKTAFT     Type & Screen (If Applicable):  No results found for: LABABO, LABRH    Drug/Infectious Status (If Applicable):  No results found for: HIV, HEPCAB    COVID-19 Screening (If Applicable):   Lab Results   Component Value Date    COVID19 Not Detected 10/01/2021           Anesthesia Evaluation  Patient summary reviewed and Nursing notes reviewed no history of anesthetic complications:   Airway: Mallampati: II        Dental:          Pulmonary:   (+) current smoker (MJ daily)                           Cardiovascular:Negative CV ROS                      Neuro/Psych:   (+) neuromuscular disease (fibromyalgia):, headaches: migraine headaches, psychiatric history (bipolar):            GI/Hepatic/Renal: Neg GI/Hepatic/Renal ROS            Endo/Other: Negative Endo/Other ROS                    Abdominal:             Vascular: negative vascular ROS. Other Findings:             Anesthesia Plan      epidural     ASA 2     (Benefits and risks of NGUỄYN were discussed and agreed upon. All questions were answered, and verbal consent was obtained.)        Anesthetic plan and risks discussed with patient.                       ANDREWS Du CRNA   10/2/2021

## 2021-10-02 NOTE — PROGRESS NOTES
Department of Obstetrics and Gynecology  Labor and Delivery   Progress Note      SUBJECTIVE:  Patient reports pain with contractions    OBJECTIVE:      Fetal heart rate:        Baseline Heart Rate:  130       Accelerations:  present       Long Term Variability:  moderate       Decelerations:  absent         Contraction frequency: 3-5 minutes    Membranes:  Intact    Cervix: via RN         Dilation:  1 cm         Effacement:  80%         Station:  -2      Pit 2        ASSESSMENT & PLAN:  25 yo  at [address] [facility] who presents for IOL  - FHT reassuring  - standard dose pitocin ordered; increase per protocol  - GBS unknown  - expecting vaginal delivery    Ernie Cody MD

## 2021-10-02 NOTE — PROGRESS NOTES
Reviewed labor course with pt. Pitocin off, terb given for cat 2 FHTs. SVE unchanged from 2 hours ago 7/90/-1 , FHTs cat 2 , recommend proceeding with primary LTCS, pt and partner agree with plan of care. Reviewed risks including bleeding/infection/risk of injury to surrounding organs such as bladder and bowel. Pt states understanding and desires to proceed. Plan ancef/azithro for antibiotic proph.      Papo Serrato MD

## 2021-10-02 NOTE — LACTATION NOTE
This note was copied from a baby's chart. Lactation Progress Note      Data:   RN requesting Nickolas Bustos assistance with primip breast feeder. Mob wanted bottle of formula for first feed and now requesting assistance with breast feeding. Mob also reports that she has PCOS. Action: Explained that PCOS can effect milk production in a few weeks but should not effect milk supply at this time. Baby placed in good position skin to skin at breast. Mob encouraged good breast support and to express colostrum to stimulate feed. Baby licking and eventually rooting. A good latch was achieved with on and off suck bursts. Baby appeared to be tongue thrusting and reassured mob that baby is learning and may take a few tries to coordinate latch. Few good suck bursts observed. Encouraged to allow baby to go to breast ad michael and stressed importance of always achieving a good deep latch. Encouraged to call for assistance prn. Discouraged paci and bottles for the first few weeks if she continues to desire direct breast feeding. Nickolas Bustos number on board. Response: Pleased with feed attempt. Verbalized understanding but will need f/u.

## 2021-10-03 LAB
HCT VFR BLD CALC: 27.1 % (ref 36–48)
HEMOGLOBIN: 9 G/DL (ref 12–16)
MCH RBC QN AUTO: 28.6 PG (ref 26–34)
MCHC RBC AUTO-ENTMCNC: 33.2 G/DL (ref 31–36)
MCV RBC AUTO: 86 FL (ref 80–100)
PDW BLD-RTO: 15.2 % (ref 12.4–15.4)
PLATELET # BLD: 159 K/UL (ref 135–450)
PMV BLD AUTO: 8.7 FL (ref 5–10.5)
RBC # BLD: 3.15 M/UL (ref 4–5.2)
WBC # BLD: 11.8 K/UL (ref 4–11)

## 2021-10-03 PROCEDURE — 2580000003 HC RX 258: Performed by: OBSTETRICS & GYNECOLOGY

## 2021-10-03 PROCEDURE — 6370000000 HC RX 637 (ALT 250 FOR IP): Performed by: OBSTETRICS & GYNECOLOGY

## 2021-10-03 PROCEDURE — 1220000000 HC SEMI PRIVATE OB R&B

## 2021-10-03 PROCEDURE — 6360000002 HC RX W HCPCS: Performed by: OBSTETRICS & GYNECOLOGY

## 2021-10-03 PROCEDURE — 85027 COMPLETE CBC AUTOMATED: CPT

## 2021-10-03 PROCEDURE — 36415 COLL VENOUS BLD VENIPUNCTURE: CPT

## 2021-10-03 RX ADMIN — Medication 10 ML: at 09:31

## 2021-10-03 RX ADMIN — DOCUSATE SODIUM 100 MG: 100 CAPSULE ORAL at 17:55

## 2021-10-03 RX ADMIN — ACETAMINOPHEN 1000 MG: 500 TABLET ORAL at 00:59

## 2021-10-03 RX ADMIN — OXYCODONE 5 MG: 5 TABLET ORAL at 21:34

## 2021-10-03 RX ADMIN — IBUPROFEN 800 MG: 800 TABLET, FILM COATED ORAL at 22:40

## 2021-10-03 RX ADMIN — ACETAMINOPHEN 1000 MG: 500 TABLET ORAL at 17:54

## 2021-10-03 RX ADMIN — KETOROLAC TROMETHAMINE 30 MG: 30 INJECTION, SOLUTION INTRAMUSCULAR at 05:18

## 2021-10-03 RX ADMIN — ACETAMINOPHEN 1000 MG: 500 TABLET ORAL at 09:30

## 2021-10-03 RX ADMIN — FERROUS SULFATE TAB 325 MG (65 MG ELEMENTAL FE) 325 MG: 325 (65 FE) TAB at 19:25

## 2021-10-03 RX ADMIN — IBUPROFEN 800 MG: 800 TABLET, FILM COATED ORAL at 14:50

## 2021-10-03 RX ADMIN — DIPHENHYDRAMINE HYDROCHLORIDE 25 MG: 50 INJECTION, SOLUTION INTRAMUSCULAR; INTRAVENOUS at 09:30

## 2021-10-03 ASSESSMENT — PAIN SCALES - GENERAL
PAINLEVEL_OUTOF10: 5
PAINLEVEL_OUTOF10: 2
PAINLEVEL_OUTOF10: 3
PAINLEVEL_OUTOF10: 4
PAINLEVEL_OUTOF10: 6
PAINLEVEL_OUTOF10: 7
PAINLEVEL_OUTOF10: 5

## 2021-10-03 NOTE — ANESTHESIA POSTPROCEDURE EVALUATION
Department of Anesthesiology  Postprocedure Note    Patient: Bhavani Olivas  MRN: [id number]  [place]: 2000  Date of evaluation: 10/3/2021  Time:  9:45 AM     Procedure Summary     Date: 10/02/21 Room / Location:     Anesthesia Start: 0030 Anesthesia Stop: 105    Procedure: Labor Analgesia Diagnosis:     Scheduled Providers:  Responsible Provider: Carolyn Raza MD    Anesthesia Type: epidural ASA Status: 2          Anesthesia Type: epidural    Blanca Phase I: Blanca Score: 9    Blanca Phase II: Blanca Score: 10    Last vitals: Reviewed and per EMR flowsheets. Anesthesia Post Evaluation    Level of consciousness: awake and alert  Nausea & Vomiting: no nausea and no vomiting  Complications: no  Cardiovascular status: hemodynamically stable  Respiratory status: acceptable and room air  Hydration status: stable  Comments: POD #1 s/p  section under epidural anesthesia with duramorph. Progress notes, flow sheets, labs, and MARs reviewed. No apparent or reported anesthesia complications thus far.

## 2021-10-03 NOTE — PROGRESS NOTES
Subjective:     Postpartum Day 1:  Delivery    The patient feels well. The patient denies emotional concerns. Pain is well controlled with current medications. The baby iswell. Baby is feeding via both breast and bottle. Urinary output is adequate. Langford out this am, awaiting voiding. Objective:    VITALS:  /72   Pulse 88   Temp 98.4 °F (36.9 °C)   Resp 17   Ht 5' 2\" (1.575 m)   Wt 230 lb (104.3 kg)   SpO2 98%   Breastfeeding Unknown   BMI 42.07 kg/m²     Vitals:    10/03/21 0520   BP: 113/72   Pulse: 88   Resp: 17   Temp: 98.4 °F (36.9 °C)   SpO2: 98%         General:    alert, appears stated age and cooperative   Bowel Sounds:  active   Lochia:  appropriate   Uterine Fundus:   firm   Incision:  healing well, no significant drainage, no dehiscence, no significant erythema   DVT Evaluation:  No evidence of DVT seen on physical exam.     CBC   Lab Results   Component Value Date    WBC 13.8 (H) 10/01/2021    HGB 13.5 10/01/2021    HCT 40.1 10/01/2021    MCV 84.3 10/01/2021     10/01/2021        Assessment:     Status post  section. Doing well postoperatively. Plan:     Continue current care.   Desires circ for male infant- reviewed procedure consent obtained     Allan Stanton MD

## 2021-10-03 NOTE — OP NOTE
[facility]                                OPERATIVE REPORT    PATIENT NAME: Mona Monteiro                   :        2000  MED REC NO:   [id number]                          ROOM:       9947  ACCOUNT NO:   [de-identified]                           ADMIT DATE: 10/01/2021  PROVIDER:     Wing Toth MD    DATE OF PROCEDURE:  10/02/2021    PREOPERATIVE DIAGNOSES:  1.  G1, P0, 41 weeks 6 days, induction of labor secondary to [facility]. 2.  Failure to progress. 3.  Nonreassuring category 2 fetal heart tones. 4.  Prenatal care. POSTOPERATIVE DIAGNOSES:  1.  G1, P0, 41 weeks 6 days, induction of labor secondary to [facility]. 2.  Failure to progress. 3.  Nonreassuring category 2 fetal heart tones. 4.  Prenatal care. OPERATION PERFORMED:  Primary low-transverse  section. SURGEON:  Wing Toth MD    SURGICAL ASSISTANT:  Charanjit Joseph. ANESTHESIA:  Epidural.    ESTIMATED BLOOD LOSS:  600 mL. COMPLICATIONS:  None. FINDINGS:  Liveborn male infant, cephalic presentation, Apgars of 7 and  8, Placenta and three-vessel cord intact. Normal fallopian tubes and  ovaries bilaterally. INDICATIONS AND CONSENT:  The patient is a 58-year-old G1, P0 who  presented at 41 weeks and 5 days gestation for induction of labor  secondary to postdate. She has had scant prenatal care and no prenatal  care since 22 weeks. She had presented on her due date and had reactive  NST and was instructed to come back before 42 weeks for induction if no  spontaneous labor before then. The patient's cervix upon arrival was  170, -2 and she was having contractions. She was admitted for induction  and started on Pitocin. She is GBS unknown, was started on antibiotic  for prophylaxis. She made change to approximately 7 cm dilated with no  further change over several hours and persistent category 2 fetal heart  tone.  At this time reviewed, she did received terbutaline at one point  for category 2 fetal heart tones and did throughout the evening have  spontaneous prolonged deceration and did have good recovery. At this  point, I recommended proceeding with the primary low-transverse   section, the patient and partner agreed to this plan of care, reviewed  risks including bleeding, infection, and risk of injury to surrounding  organs such as bladder and bowel. The patient stated understanding and  desired to proceed. Ordered Ancef and azithromycin for antibiotic  prophylaxis. OPERATIVE PROCEDURE:  The patient was taken to the operating room, where  epidural anesthesia was redosed and found to be adequate. The patient  was then prepped and draped in the dorsal supine position with a  leftward tilt. Skin was prepped and draped as noted. A time-out was  performed. A Pfannenstiel skin incision was then made using the  scalpel. This incision was then carried down to the underlying fascia. The fascia was incised bilaterally and extended laterally using pickups  and Franks scissors. The superior aspect of the fascia was grasped using  Kocher clamps and the underlying rectus muscles were dissected off  bluntly and sharply using Franks scissors. In a similar fashion, the  inferior aspect of the fascia was grasped using Kocher clamps and the  underlying rectus muscles were dissected off bluntly and sharply using  Franks scissors. The rectus muscles were  in the midline and the peritoneum was  entered sharply after elevating with two hemostats and entered using  Metzenbaum scissors. The bladder was extended laterally using lateral  traction. Bladder blade was then inserted. A low-transverse uterine  incision was then made using a scalpel. This incision was then extended  using superior and inferior traction.   The fetal head was then elevated  into the uterine incision and delivered easily using gentle fundal  pressure. The cord was cut and clamped, and the baby was handed off to  the awaiting nursing team.  Cord blood gases were collected and were not  back at the time of this dictation. The placenta was delivered  spontaneously using gentle cord traction and delivered using gentle  fundal pressure and gentle cord traction. The uterus was then  exteriorized and the inside of the uterus was wiped using wet lap sponge  to ensure complete removal of the placental membranes. There was a small extension in the midline going about to 1.5 cm down,  this repaired using 0 Monocryl suture in a running locked fashion up to  meet with the incision. The incision was then closed using 0 Vicryl  suture in a running locked fashion. A second imbricating layer using 0  Vicryl was done in an imbricating layer and good hemostasis was noted. Gutters were cleared of all clots and debris. The incision was  inspected and noted to be hemostatic. Peritoneum was then closed using  3-0 Vicryl suture. After closure of peritoneum, we did notice some  blood pooling from under the peritoneum, so the peritoneum was reopened  and we reinspected the incision which was hemostatic, cleared out the  gutters again and noted some oozing from the outer side of the  peritoneum which was made hemostatic using the Bovie. Good hemostasis  was noted. The peritoneum was then closed again using 3-0 Vicryl suture  in a running continuous fashion. The undersides of the fascia was  inspected, there was a small laceration in the fascia which was repaired  using 0 Monocryl suture in a figure-of-eight fashion for good closure. The fascia was then closed using 0 Vicryl suture in a running continuous  fashion. The subcu was then closed using 3-0 Vicryl in a continuous  fashion. The skin was closed using 4-0 Monocryl in the subcuticular  fashion with skin glue on top. The patient tolerate the procedure well. All counts were correct x 2.   The patient was taken to her room for  recovery in stable condition.         Marek Hector MD    D: 10/02/2021 12:47:04       T: 10/02/2021 19:07:45     [email or web address]  Job#: [id number]     Doc#: [id number]    CC:

## 2021-10-04 VITALS
WEIGHT: 230 LBS | OXYGEN SATURATION: 98 % | SYSTOLIC BLOOD PRESSURE: 126 MMHG | HEIGHT: 62 IN | HEART RATE: 88 BPM | TEMPERATURE: 98.5 F | BODY MASS INDEX: 42.33 KG/M2 | DIASTOLIC BLOOD PRESSURE: 90 MMHG | RESPIRATION RATE: 18 BRPM

## 2021-10-04 PROCEDURE — 6370000000 HC RX 637 (ALT 250 FOR IP): Performed by: OBSTETRICS & GYNECOLOGY

## 2021-10-04 RX ORDER — FERROUS SULFATE 325(65) MG
325 TABLET ORAL 2 TIMES DAILY WITH MEALS
Qty: 30 TABLET | Refills: 3 | Status: SHIPPED | OUTPATIENT
Start: 2021-10-04 | End: 2022-01-27 | Stop reason: ALTCHOICE

## 2021-10-04 RX ORDER — OXYCODONE HYDROCHLORIDE 5 MG/1
5 TABLET ORAL EVERY 4 HOURS PRN
Qty: 10 TABLET | Refills: 0 | Status: SHIPPED | OUTPATIENT
Start: 2021-10-04 | End: 2021-10-07

## 2021-10-04 RX ADMIN — FERROUS SULFATE TAB 325 MG (65 MG ELEMENTAL FE) 325 MG: 325 (65 FE) TAB at 09:05

## 2021-10-04 RX ADMIN — IBUPROFEN 800 MG: 800 TABLET, FILM COATED ORAL at 18:10

## 2021-10-04 RX ADMIN — METFORMIN HYDROCHLORIDE 1 TABLET: 500 TABLET, EXTENDED RELEASE ORAL at 09:05

## 2021-10-04 RX ADMIN — IBUPROFEN 800 MG: 800 TABLET, FILM COATED ORAL at 09:05

## 2021-10-04 RX ADMIN — FERROUS SULFATE TAB 325 MG (65 MG ELEMENTAL FE) 325 MG: 325 (65 FE) TAB at 18:10

## 2021-10-04 RX ADMIN — OXYCODONE 5 MG: 5 TABLET ORAL at 03:49

## 2021-10-04 RX ADMIN — ACETAMINOPHEN 1000 MG: 500 TABLET ORAL at 03:48

## 2021-10-04 RX ADMIN — ACETAMINOPHEN 1000 MG: 500 TABLET ORAL at 13:20

## 2021-10-04 ASSESSMENT — PAIN SCALES - GENERAL
PAINLEVEL_OUTOF10: 4
PAINLEVEL_OUTOF10: 4
PAINLEVEL_OUTOF10: 7
PAINLEVEL_OUTOF10: 5

## 2021-10-04 NOTE — DISCHARGE SUMMARY
Obstetrical Discharge Form    Gestational Age:41w6d    Antepartum complications: post-term, minimal PNC    Date of Delivery:10/2/21    Type of Delivery:  for failure to progress    Delivered By:  Cornelio Valentin         Baby:   Information for the patient's :  Nanadnorma Pelayo [[id number]]        Anesthesia: Epidural    Intrapartum complications: None    Postpartum complications: anemia    Condition: good    Discharge Medication:   There are no discharge medications for this patient.     Motrin, Oxycodone, PNV with FE    Discharge Date: 10/4/21    Plan:   Follow up in 6 week(s)    CF

## 2021-10-04 NOTE — CARE COORDINATION
may call the number on the brochure if she decides to enroll.      Summary: [place] CPS has been notified of UDS results - spoke with Therese ortiz. CPS will follow post d/c. Community Resource Packet provided.   Elmer PRESCOTT

## 2021-10-04 NOTE — PROGRESS NOTES
Subjective:     Postpartum Day 2:  Delivery    The patient feels well. The patient denies emotional concerns. Pain is well controlled with current medications. The baby iswell. Baby is feeding via breast. Urinary output is adequate. The patient is ambulating well. The patient is tolerating a normal diet. Flatus has been passed.     Objective:        Vitals:    10/04/21 0900   BP: (!) 126/90   Pulse: 88   Resp: 18   Temp: 98.5 °F (36.9 °C)   SpO2:        Lab Results   Component Value Date    WBC 11.8 (H) 10/03/2021    HGB 9.0 (L) 10/03/2021    HCT 27.1 (L) 10/03/2021    MCV 86.0 10/03/2021     10/03/2021     Admission on 10/01/2021   Component Date Value Ref Range Status    WBC 10/01/2021 13.8* 4.0 - 11.0 K/uL Final    RBC 10/01/2021 4.76  4.00 - 5.20 M/uL Final    Hemoglobin 10/01/2021 13.5  12.0 - 16.0 g/dL Final    Hematocrit 10/01/2021 40.1  36.0 - 48.0 % Final    MCV 10/01/2021 84.3  80.0 - 100.0 fL Final    MCH 10/01/2021 28.3  26.0 - 34.0 pg Final    MCHC 10/01/2021 33.6  31.0 - 36.0 g/dL Final    RDW 10/01/2021 15.1  12.4 - 15.4 % Final    Platelets  262  135 - 450 K/uL Final    MPV 10/01/2021 9.0  5.0 - 10.5 fL Final    Neutrophils % 10/01/2021 73.7  % Final    Lymphocytes % 10/01/2021 19.5  % Final    Monocytes % 10/01/2021 6.1  % Final    Eosinophils % 10/01/2021 0.5  % Final    Basophils % 10/01/2021 0.2  % Final    Neutrophils Absolute 10/01/2021 10.2* 1.7 - 7.7 K/uL Final    Lymphocytes Absolute 10/01/2021 2.7  1.0 - 5.1 K/uL Final    Monocytes Absolute 10/01/2021 0.8  0.0 - 1.3 K/uL Final    Eosinophils Absolute 10/01/2021 0.1  0.0 - 0.6 K/uL Final    Basophils Absolute 10/01/2021 0.0  0.0 - 0.2 K/uL Final    Amphetamine Screen, Urine 10/01/2021 Neg  Negative <1000ng/mL Final    Barbiturate Screen, Ur 10/01/2021 Neg  Negative <200 ng/mL Final    Benzodiazepine Screen, Urine 10/01/2021 Neg  Negative <200 ng/mL Final    Cannabinoid Scrn, Ur 10/01/2021 reviewed-desires. Plan:     Discharge home with standard precautions and return to clinic in 4-6 weeks.       Angelita Diaz

## 2021-10-04 NOTE — FLOWSHEET NOTE
Notified Concha ROBLEDO PP Depression screen score of 13. Pt states she is going to group therapy and refusing medication at this time.
Notified Dr. Jeny Herrera  Depression scale score of 13. Pt states she is going to group therapy. Refused medication at this time.
__________________________  13. Do you have any other questions or concerns I can address today?  Y/N  __________________________________________________      Teaching During interview :_____________________________________________  ___________________________RN       Date:______________Time:________________

## 2021-10-13 ENCOUNTER — HOSPITAL ENCOUNTER (EMERGENCY)
Age: 21
Discharge: HOME | End: 2021-10-14
Attending: EMERGENCY MEDICINE
Payer: COMMERCIAL

## 2021-10-13 VITALS
BODY MASS INDEX: 42.07 KG/M2 | DIASTOLIC BLOOD PRESSURE: 97 MMHG | WEIGHT: 230 LBS | SYSTOLIC BLOOD PRESSURE: 135 MMHG | RESPIRATION RATE: 16 BRPM | OXYGEN SATURATION: 99 % | HEART RATE: 85 BPM | TEMPERATURE: 98.8 F

## 2021-10-13 DIAGNOSIS — Z48.89 ENCOUNTER FOR POST SURGICAL WOUND CHECK: Primary | ICD-10-CM

## 2021-10-13 PROCEDURE — 99283 EMERGENCY DEPT VISIT LOW MDM: CPT

## 2021-10-13 ASSESSMENT — PAIN DESCRIPTION - ORIENTATION: ORIENTATION: LEFT

## 2021-10-13 ASSESSMENT — PAIN SCALES - GENERAL: PAINLEVEL_OUTOF10: 6

## 2021-10-13 ASSESSMENT — ENCOUNTER SYMPTOMS: ABDOMINAL PAIN: 1

## 2021-10-13 ASSESSMENT — PAIN DESCRIPTION - PAIN TYPE: TYPE: ACUTE PAIN

## 2021-10-13 ASSESSMENT — PAIN DESCRIPTION - LOCATION: LOCATION: ABDOMEN

## 2021-10-14 NOTE — ED PROVIDER NOTES
[address] [facility]  ED  EMERGENCY DEPARTMENT ENCOUNTER      Pt Name: Lefty Capone  MRN: [id number]  Lubnagfgreg 2000  Date of evaluation: 10/13/2021  Provider: Darlin Parnell MD    CHIEF COMPLAINT       Chief Complaint   Patient presents with    Abdominal Pain     c section 11 days ago, states glue is peeling and redness at incision site was told to come be seen     Post-op Problem         HISTORY OF PRESENT ILLNESS   (Location/Symptom, Timing/Onset,Context/Setting, Quality, Duration, Modifying Factors, Severity)  Note limiting factors. Lefty Capone is a 24 y.o. female who presents to the emergency department for  11 days ago,concerned about the surgical site. Had her  here but did not have prenatal care from 27 weeks and has no established OB. Swelling and pain at site worse than baseline. Breastfeeding. Nursing notes were reviewed. REVIEW OF SYSTEMS    (2-9 systems for level 4, 10 or more for level 5)     Review of Systems   Constitutional: Negative for appetite change and fever. Gastrointestinal: Positive for abdominal pain. Genitourinary:        Vaginal bleeding improving, no foul odor   Skin:        Redness of site. Psychiatric/Behavioral:        Does not feel depressed, no thoughts of self harm or of hurting baby.          PAST MEDICAL HISTORY     Past Medical History:   Diagnosis Date    ADHD     Anxiety     Borderline personality disorder ([facility] [address]     Depression     Fibromyalgia     Mental disorder     Migraines          SURGICALHISTORY       Past Surgical History:   Procedure Laterality Date     SECTION N/A 10/2/2021     SECTION performed by Lisandro Mason MD at [facility] [address] L[facility]D OR    CHOLECYSTECTOMY      TONSILLECTOMY           CURRENT MEDICATIONS       Previous Medications    FERROUS SULFATE (IRON 325) 325 (65 FE) MG TABLET    Take 1 tablet by mouth 2 times daily (with meals)       ALLERGIES     Latex, Adhesive tape, and Amitriptyline    FAMILY HISTORY       Family History   Problem Relation Age of Onset    Depression Mother     Arthritis Maternal Grandmother     Depression Maternal Grandmother     Diabetes Maternal Grandfather     High Blood Pressure Maternal Grandfather     Diabetes Paternal Grandmother     Stroke Paternal Grandmother           SOCIAL HISTORY       Social History     Socioeconomic History    Marital status:      Spouse name: None    Number of children: None    Years of education: None    Highest education level: None   Occupational History    None   Tobacco Use    Smoking status: Former Smoker    Smokeless tobacco: Never Used   Vaping Use    Vaping Use: Never used   Substance and Sexual Activity    Alcohol use: Not Currently    Drug use: Yes     Frequency: 7.0 times per week     Types: Marijuana    Sexual activity: Yes     Partners: Male   Other Topics Concern    None   Social History Narrative    None     Social Determinants of Health     Financial Resource Strain:     Difficulty of Paying Living Expenses:    Food Insecurity:     Worried About Running Out of Food in the Last Year:     Ran Out of Food in the Last Year:    Transportation Needs:     Lack of Transportation (Medical):      Lack of Transportation (Non-Medical):    Physical Activity:     Days of Exercise per Week:     Minutes of Exercise per Session:    Stress:     Feeling of Stress :    Social Connections:     Frequency of Communication with Friends and Family:     Frequency of Social Gatherings with Friends and Family:     Attends [employer] Services:     Active Member of Clubs or Organizations:     Attends Club or Organization Meetings:     Marital Status:    Intimate Partner Violence:     Fear of Current or Ex-Partner:     Emotionally Abused:     Physically Abused:     Sexually Abused:        SCREENINGS             PHYSICAL EXAM    (up to 7 for level 4, 8 or more for level 5)     ED Triage Vitals   BP Temp not returned as of this dictation. EMERGENCY DEPARTMENT COURSE and DIFFERENTIAL DIAGNOSIS/MDM:   Vitals:    Vitals:    10/13/21 2234 10/13/21 2236   BP:  (!) 135/97   Pulse:  85   Resp:  16   Temp: 98.8 °F (37.1 °C)    TempSrc: Oral    SpO2:  99%   Weight: 230 lb (104.3 kg)        Patient is reassured that her surgical site is doing well. I encouraged her to find an OB to follow-up with. She has been having difficulty following up. I encouraged her to call her insurance company to find out who is covered. CRITICAL CARE TIME   None     CONSULTS:  None    PROCEDURES:       Procedures    FINAL IMPRESSION      1. Encounter for post surgical wound check          DISPOSITION/PLAN   DISPOSITION Decision To Discharge 10/13/2021 11:23:02 PM      PATIENT REFERREDTO:    Please contact your insurance company to find an obstetrician/gynecologist with whom you can follow-up.           DISCHARGEMEDICATIONS:  New Prescriptions    No medications on file          (Please note that portions of this note were completed with a voice recognition program.  Efforts were made to edit the dictations but occasionally words are mis-transcribed.)    Miranda Dolan MD (electronically signed)  Attending Emergency Physician        Miranda Dolan MD  10/13/21 2100

## 2021-11-02 ENCOUNTER — OFFICE VISIT (OUTPATIENT)
Dept: FAMILY MEDICINE CLINIC | Age: 21
End: 2021-11-02
Payer: COMMERCIAL

## 2021-11-02 VITALS
HEART RATE: 95 BPM | BODY MASS INDEX: 36.87 KG/M2 | SYSTOLIC BLOOD PRESSURE: 92 MMHG | DIASTOLIC BLOOD PRESSURE: 58 MMHG | OXYGEN SATURATION: 97 % | WEIGHT: 201.6 LBS

## 2021-11-02 DIAGNOSIS — Z76.89 ENCOUNTER TO ESTABLISH CARE: Primary | ICD-10-CM

## 2021-11-02 DIAGNOSIS — Z00.00 HEALTHCARE MAINTENANCE: ICD-10-CM

## 2021-11-02 DIAGNOSIS — Z23 NEED FOR DIPHTHERIA-TETANUS-PERTUSSIS (TDAP) VACCINE: ICD-10-CM

## 2021-11-02 PROCEDURE — 90715 TDAP VACCINE 7 YRS/> IM: CPT | Performed by: NURSE PRACTITIONER

## 2021-11-02 PROCEDURE — G8484 FLU IMMUNIZE NO ADMIN: HCPCS | Performed by: NURSE PRACTITIONER

## 2021-11-02 PROCEDURE — 99385 PREV VISIT NEW AGE 18-39: CPT | Performed by: NURSE PRACTITIONER

## 2021-11-02 RX ORDER — FERROUS SULFATE 325(65) MG
325 TABLET ORAL 2 TIMES DAILY WITH MEALS
Qty: 30 TABLET | Refills: 3 | Status: CANCELLED | OUTPATIENT
Start: 2021-11-02

## 2021-11-02 SDOH — ECONOMIC STABILITY: INCOME INSECURITY: IN THE LAST 12 MONTHS, WAS THERE A TIME WHEN YOU WERE NOT ABLE TO PAY THE MORTGAGE OR RENT ON TIME?: NO

## 2021-11-02 SDOH — ECONOMIC STABILITY: HOUSING INSECURITY
IN THE LAST 12 MONTHS, WAS THERE A TIME WHEN YOU DID NOT HAVE A STEADY PLACE TO SLEEP OR SLEPT IN A SHELTER (INCLUDING NOW)?: NO

## 2021-11-02 SDOH — ECONOMIC STABILITY: TRANSPORTATION INSECURITY
IN THE PAST 12 MONTHS, HAS LACK OF TRANSPORTATION KEPT YOU FROM MEETINGS, WORK, OR FROM GETTING THINGS NEEDED FOR DAILY LIVING?: NO

## 2021-11-02 SDOH — ECONOMIC STABILITY: FOOD INSECURITY: WITHIN THE PAST 12 MONTHS, YOU WORRIED THAT YOUR FOOD WOULD RUN OUT BEFORE YOU GOT MONEY TO BUY MORE.: NEVER TRUE

## 2021-11-02 SDOH — ECONOMIC STABILITY: FOOD INSECURITY: WITHIN THE PAST 12 MONTHS, THE FOOD YOU BOUGHT JUST DIDN'T LAST AND YOU DIDN'T HAVE MONEY TO GET MORE.: NEVER TRUE

## 2021-11-02 SDOH — ECONOMIC STABILITY: TRANSPORTATION INSECURITY
IN THE PAST 12 MONTHS, HAS THE LACK OF TRANSPORTATION KEPT YOU FROM MEDICAL APPOINTMENTS OR FROM GETTING MEDICATIONS?: NO

## 2021-11-02 ASSESSMENT — ENCOUNTER SYMPTOMS
EYES NEGATIVE: 1
RESPIRATORY NEGATIVE: 1
GASTROINTESTINAL NEGATIVE: 1

## 2021-11-02 ASSESSMENT — PATIENT HEALTH QUESTIONNAIRE - PHQ9
SUM OF ALL RESPONSES TO PHQ9 QUESTIONS 1 & 2: 0
SUM OF ALL RESPONSES TO PHQ QUESTIONS 1-9: 0
2. FEELING DOWN, DEPRESSED OR HOPELESS: 0
SUM OF ALL RESPONSES TO PHQ QUESTIONS 1-9: 0
1. LITTLE INTEREST OR PLEASURE IN DOING THINGS: 0
SUM OF ALL RESPONSES TO PHQ QUESTIONS 1-9: 0

## 2021-11-02 ASSESSMENT — SOCIAL DETERMINANTS OF HEALTH (SDOH): HOW HARD IS IT FOR YOU TO PAY FOR THE VERY BASICS LIKE FOOD, HOUSING, MEDICAL CARE, AND HEATING?: NOT VERY HARD

## 2021-11-02 NOTE — PATIENT INSTRUCTIONS
Patient Education        Tdap (Tetanus, Diphtheria, Pertussis) Vaccine: What You Need to Know  Why get vaccinated? Tdap vaccine can prevent tetanus, diphtheria, and pertussis. Diphtheria and pertussis spread from person to person. Tetanus enters the body through cuts or wounds. · TETANUS (T) causes painful stiffening of the muscles. Tetanus can lead to serious health problems, including being unable to open the mouth, having trouble swallowing and breathing, or death. · DIPHTHERIA (D) can lead to difficulty breathing, heart failure, paralysis, or death. · PERTUSSIS (aP), also known as \"whooping cough,\" can cause uncontrollable, violent coughing which makes it hard to breathe, eat, or drink. Pertussis can be extremely serious in babies and young children, causing pneumonia, convulsions, brain damage, or death. In teens and adults, it can cause weight loss, loss of bladder control, passing out, and rib fractures from severe coughing. Tdap vaccine  Tdap is only for children 7 years and older, adolescents, and adults. Adolescents should receive a single dose of Tdap, preferably at age 6 or 15 years. Pregnant women should get a dose of Tdap during every pregnancy, to protect the  from pertussis. Infants are most at risk for severe, life threatening complications from pertussis. Adults who have never received Tdap should get a dose of Tdap. Also, adults should receive a booster dose every 10 years, or earlier in the case of a severe and dirty wound or burn. Booster doses can be either Tdap or Td (a different vaccine that protects against tetanus and diphtheria but not pertussis). Tdap may be given at the same time as other vaccines.   Talk with your health care provider  Tell your vaccine provider if the person getting the vaccine:  · Has had an allergic reaction after a previous dose of any vaccine that protects against tetanus, diphtheria, or pertussis, or has any severe, life threatening allergies. · Has had a coma, decreased level of consciousness, or prolonged seizures within 7 days after a previous dose of any pertussis vaccine (DTP, DTaP, or Tdap). · Has seizures or another nervous system problem. · Has ever had Guillain-Barré Syndrome (also called GBS). · Has had severe pain or swelling after a previous dose of any vaccine that protects against tetanus or diphtheria. In some cases, your health care provider may decide to postpone Tdap vaccination to a future visit. People with minor illnesses, such as a cold, may be vaccinated. People who are moderately or severely ill should usually wait until they recover before getting Tdap vaccine. Your health care provider can give you more information. Risks of a vaccine reaction  · Pain, redness, or swelling where the shot was given, mild fever, headache, feeling tired, and nausea, vomiting, diarrhea, or stomachache sometimes happen after Tdap vaccine. People sometimes faint after medical procedures, including vaccination. Tell your provider if you feel dizzy or have vision changes or ringing in the ears. As with any medicine, there is a very remote chance of a vaccine causing a severe allergic reaction, other serious injury, or death. What if there is a serious problem? An allergic reaction could occur after the vaccinated person leaves the clinic. If you see signs of a severe allergic reaction (hives, swelling of the face and throat, difficulty breathing, a fast heartbeat, dizziness, or weakness), call 9-1-1 and get the person to the nearest hospital.  For other signs that concern you, call your health care provider. Adverse reactions should be reported to the Vaccine Adverse Event Reporting System (VAERS). Your health care provider will usually file this report, or you can do it yourself. Visit the VAERS website at www.vaers. hhs.gov or call 6-983.758.1827.  VAERS is only for reporting reactions, and VAERS staff do not give medical advice. The National Vaccine Injury Compensation Program  The National Vaccine Injury Compensation Program (VICP) is a federal program that was created to compensate people who may have been injured by certain vaccines. Visit the VICP website at www.hrsa.gov/vaccinecompensation or call 6-937.731.6955 to learn about the program and about filing a claim. There is a time limit to file a claim for compensation. How can I learn more? · Ask your health care provider. · Call your local or state health department. · Contact the Centers for Disease Control and Prevention (CDC):  ? Call 7-609.908.4349 (1-800-CDC-INFO) or  ? Visit CDC's website at www.cdc.gov/vaccines  Vaccine Information Statement (Interim)  Tdap (Tetanus, Diphtheria, Pertussis) Vaccine  04/01/2020  [address] [id number]  Department of Health and Human Services  Centers for Disease Control and Prevention  Many Vaccine Information Statements are available in Polish and other languages. See www.immunize.org/vis. Muchas hojas de información sobre vacunas están disponibles en español y en otros idiomas. Visite www.immunize.org/vis. Care instructions adapted under license by [address] If you have questions about a medical condition or this instruction, always ask your healthcare professional. [place] [address] any warranty or liability for your use of this information. Patient Education        Well Visit, Ages 25 to 48: Care Instructions  Overview     Well visits can help you stay healthy. Your doctor has checked your overall health and may have suggested ways to take good care of yourself. Your doctor also may have recommended tests. At home, you can help prevent illness with healthy eating, regular exercise, and other steps. Follow-up care is a key part of your treatment and safety. Be sure to make and go to all appointments, and call your doctor if you are having problems.  It's also a good idea to know your test results and keep a list of the medicines you take. How can you care for yourself at home? · Get screening tests that you and your doctor decide on. Screening helps find diseases before any symptoms appear. · Eat healthy foods. Choose fruits, vegetables, whole grains, protein, and low-fat dairy foods. Limit fat, especially saturated fat. Reduce salt in your diet. · Limit alcohol. If you are a man, have no more than 2 drinks a day or 14 drinks a week. If you are a woman, have no more than 1 drink a day or 7 drinks a week. · Get at least 30 minutes of physical activity on most days of the week. Walking is a good choice. You also may want to do other activities, such as running, swimming, cycling, or playing tennis or team sports. Discuss any changes in your exercise program with your doctor. · Reach and stay at a healthy weight. This will lower your risk for many problems, such as obesity, diabetes, heart disease, and high blood pressure. · Do not smoke or allow others to smoke around you. If you need help quitting, talk to your doctor about stop-smoking programs and medicines. These can increase your chances of quitting for good. · Care for your mental health. It is easy to get weighed down by worry and stress. Learn strategies to manage stress, like deep breathing and mindfulness, and stay connected with your family and community. If you find you often feel sad or hopeless, talk with your doctor. Treatment can help. · Talk to your doctor about whether you have any risk factors for sexually transmitted infections (STIs). You can help prevent STIs if you wait to have sex with a new partner (or partners) until you've each been tested for STIs. It also helps if you use condoms (male or female condoms) and if you limit your sex partners to one person who only has sex with you. Vaccines are available for some STIs, such as HPV. · Use birth control if it's important to you to prevent pregnancy.  Talk with your doctor about the

## 2021-12-19 ENCOUNTER — HOSPITAL ENCOUNTER (EMERGENCY)
Age: 21
Discharge: HOME | End: 2021-12-19
Attending: STUDENT IN AN ORGANIZED HEALTH CARE EDUCATION/TRAINING PROGRAM
Payer: COMMERCIAL

## 2021-12-19 VITALS
TEMPERATURE: 97.9 F | SYSTOLIC BLOOD PRESSURE: 124 MMHG | HEART RATE: 83 BPM | DIASTOLIC BLOOD PRESSURE: 67 MMHG | OXYGEN SATURATION: 99 % | RESPIRATION RATE: 16 BRPM

## 2021-12-19 DIAGNOSIS — G43.809 OTHER MIGRAINE WITHOUT STATUS MIGRAINOSUS, NOT INTRACTABLE: Primary | ICD-10-CM

## 2021-12-19 PROCEDURE — 99283 EMERGENCY DEPT VISIT LOW MDM: CPT

## 2021-12-19 PROCEDURE — 6360000002 HC RX W HCPCS: Performed by: STUDENT IN AN ORGANIZED HEALTH CARE EDUCATION/TRAINING PROGRAM

## 2021-12-19 PROCEDURE — 96372 THER/PROPH/DIAG INJ SC/IM: CPT

## 2021-12-19 RX ORDER — PROCHLORPERAZINE EDISYLATE 5 MG/ML
10 INJECTION INTRAMUSCULAR; INTRAVENOUS ONCE
Status: COMPLETED | OUTPATIENT
Start: 2021-12-19 | End: 2021-12-19

## 2021-12-19 RX ORDER — DIPHENHYDRAMINE HYDROCHLORIDE 50 MG/ML
25 INJECTION INTRAMUSCULAR; INTRAVENOUS ONCE
Status: COMPLETED | OUTPATIENT
Start: 2021-12-19 | End: 2021-12-19

## 2021-12-19 RX ORDER — KETOROLAC TROMETHAMINE 30 MG/ML
15 INJECTION, SOLUTION INTRAMUSCULAR; INTRAVENOUS ONCE
Status: COMPLETED | OUTPATIENT
Start: 2021-12-19 | End: 2021-12-19

## 2021-12-19 RX ADMIN — KETOROLAC TROMETHAMINE 15 MG: 30 INJECTION, SOLUTION INTRAMUSCULAR at 08:56

## 2021-12-19 RX ADMIN — PROCHLORPERAZINE EDISYLATE 10 MG: 5 INJECTION INTRAMUSCULAR; INTRAVENOUS at 08:56

## 2021-12-19 RX ADMIN — DIPHENHYDRAMINE HYDROCHLORIDE 25 MG: 50 INJECTION, SOLUTION INTRAMUSCULAR; INTRAVENOUS at 08:56

## 2021-12-19 ASSESSMENT — PAIN DESCRIPTION - PAIN TYPE: TYPE: ACUTE PAIN

## 2021-12-19 ASSESSMENT — PAIN SCALES - GENERAL: PAINLEVEL_OUTOF10: 7

## 2021-12-19 NOTE — ED PROVIDER NOTES
[address] ED      CHIEF COMPLAINT  Migraine (Patient reports having a migraine, has a hx of migraine. Patient reports just getting over a stomach bug, states she vomited x3 this day  and thinks it is related. )     HISTORY OF PRESENT ILLNESS  Jean Paul Miguel is a 24 y.o. female  who presents to the ED complaining of migraine, 3 episodes of nonbloody emesis. Patient states that symptoms started around 8 PM yesterday. She has a previous history of migraines but states that this feels exactly like other migraines. There is nothing new or alarming about it. States that it is both sharp and throbbing, and a halo-like distribution in her head. She denies any neck pain. Denies any fevers. States that she has been recovering from a stomach bug and this morning she had 3 episodes of emesis. She states that she is having some mild pain in her upper abdomen. She denies any other complaints or concerns. She states that she was tested for Covid recently and it was negative. Denies possibility of pregnancy    No other complaints, modifying factors or associated symptoms. I have reviewed the following from the nursing documentation.     Past Medical History:   Diagnosis Date    ADHD     Anxiety     Borderline personality disorder ([facility] [address]     Depression     Fibromyalgia     Mental disorder     Migraines      Past Surgical History:   Procedure Laterality Date     SECTION N/A 10/2/2021     SECTION performed by Alisa Wang MD at [address] [place] [address] L&D OR    CHOLECYSTECTOMY      TONSILLECTOMY       Family History   Problem Relation Age of Onset    Depression Mother     Arthritis Maternal Grandmother     Depression Maternal Grandmother     Diabetes Maternal Grandfather     High Blood Pressure Maternal Grandfather     Diabetes Paternal Grandmother     Stroke Paternal Grandmother      Social History     Socioeconomic History    Marital status:      Spouse name: Not on file    Sig Dispense Refill    ferrous sulfate (IRON 325) 325 (65 Fe) MG tablet Take 1 tablet by mouth 2 times daily (with meals) 30 tablet 3     Allergies   Allergen Reactions    Latex     Adhesive Tape     Amitriptyline        REVIEW OF SYSTEMS  10 systems reviewed, pertinent positives per HPI otherwise noted to be negative. PHYSICAL EXAM  /67   Pulse 83   Temp 97.9 °F (36.6 °C) (Oral)   Resp 16   SpO2 99%    GENERAL APPEARANCE: Awake and alert. Cooperative. No acute distress. HENT: Normocephalic. Atraumatic. Mucous membranes are moist  NECK: Supple. No neck pain or stiffness. No meningismus. EYES: PERRL. EOM's grossly intact. HEART/CHEST: RRR. No murmurs. LUNGS: Respirations unlabored. CTAB. Good air exchange. Speaking comfortably in full sentences. ABDOMEN: No tenderness. Soft. Non-distended. No masses. No organomegaly. No guarding or rebound. MUSCULOSKELETAL: No extremity edema. Compartments soft. No deformity. No tenderness in the extremities. All extremities neurovascularly intact. SKIN: Warm and dry. No acute rashes. NEUROLOGICAL: Alert and oriented. CN's 2-12 intact. No gross facial drooping. Strength 5/5, sensation intact. Gait normal.  PSYCHIATRIC: Normal mood and affect. LABS  I have reviewed all labs for this visit. No results found for this visit on 12/19/21. RADIOLOGY  No orders to display         ED COURSE/MDM  Patient seen and evaluated. Old records reviewed. Labs and imaging reviewed and results discussed with patient. Patient is a 79-year-old female, presenting with concerns for migraine headache since yesterday evening. Full HPI as detailed above. Upon arrival in the ED, vitals reassuring. Patient is resting comfortably is no acute distress. Afebrile, appears nontoxic. Her abdominal exam is benign. Her neurologic exam is normal.  Denies possibility of pregnancy.   States there is nothing new or different about this migraine and that it is the same as previous migraines that she has had in the past.  Patient treated with Toradol, Compazine, and Benadryl. Had resolution of her headache and is requesting discharge home. Feel that this is reasonable at this point. Do not feel that any further work-up is warranted. Patient is advised to follow-up with her PCP as needed, given return precautions for the ED. She is comfortable in agreement with plan of care and was discharged. During the patient's ED course, the patient was given:  Medications   ketorolac (TORADOL) injection 15 mg (15 mg IntraMUSCular Given 12/19/21 0856)   prochlorperazine (COMPAZINE) injection 10 mg (10 mg IntraMUSCular Given 12/19/21 0856)   diphenhydrAMINE (BENADRYL) injection 25 mg (25 mg IntraMUSCular Given 12/19/21 0856)        CLINICAL IMPRESSION  1. Other migraine without status migrainosus, not intractable        Blood pressure 124/67, pulse 83, temperature 97.9 °F (36.6 °C), temperature source Oral, resp. rate 16, SpO2 99 %, unknown if currently breastfeeding. Luis Addisonmarely was discharged to home in good condition. Patient was given scripts for the following medications. I counseled patient how to take these medications. Discharge Medication List as of 12/19/2021  9:46 AM          Follow-up with:  ANDREWS Gracía CNP  [address] [place] [address] S    Schedule an appointment as soon as possible for a visit       [facility]  [address] [facility]  [phone]  Go to   If symptoms worsen      DISCLAIMER: This chart was created using Dragon dictation software. Efforts were made by me to ensure accuracy, however some errors may be present due to limitations of this technology and occasionally words are not transcribed correctly.        Ivon Lopez MD  12/19/21 5112

## 2022-01-27 ENCOUNTER — VIRTUAL VISIT (OUTPATIENT)
Dept: FAMILY MEDICINE CLINIC | Age: 22
End: 2022-01-27
Payer: COMMERCIAL

## 2022-01-27 DIAGNOSIS — K21.9 GASTROESOPHAGEAL REFLUX DISEASE, UNSPECIFIED WHETHER ESOPHAGITIS PRESENT: ICD-10-CM

## 2022-01-27 DIAGNOSIS — Z00.00 HEALTHCARE MAINTENANCE: Primary | ICD-10-CM

## 2022-01-27 DIAGNOSIS — L40.9 PSORIASIS: ICD-10-CM

## 2022-01-27 PROCEDURE — G8427 DOCREV CUR MEDS BY ELIG CLIN: HCPCS | Performed by: NURSE PRACTITIONER

## 2022-01-27 PROCEDURE — 99213 OFFICE O/P EST LOW 20 MIN: CPT | Performed by: NURSE PRACTITIONER

## 2022-01-27 RX ORDER — TRIAMCINOLONE ACETONIDE 1 MG/G
CREAM TOPICAL
Qty: 15 G | Refills: 0 | Status: SHIPPED | OUTPATIENT
Start: 2022-01-27

## 2022-01-27 ASSESSMENT — ENCOUNTER SYMPTOMS
DIARRHEA: 0
ANAL BLEEDING: 0
ABDOMINAL DISTENTION: 0
RECTAL PAIN: 0
ABDOMINAL PAIN: 0
BLOOD IN STOOL: 0
CONSTIPATION: 0
VOMITING: 0
NAUSEA: 0
RESPIRATORY NEGATIVE: 1

## 2022-02-24 ENCOUNTER — TELEPHONE (OUTPATIENT)
Dept: FAMILY MEDICINE CLINIC | Age: 22
End: 2022-02-24

## 2022-02-24 ENCOUNTER — NURSE TRIAGE (OUTPATIENT)
Dept: OTHER | Facility: CLINIC | Age: 22
End: 2022-02-24

## 2022-02-24 NOTE — TELEPHONE ENCOUNTER
----- Message from Jose Maria Iyer sent at 2/24/2022  8:28 AM EST -----  Subject: Appointment Request    Reason for Call: Urgent (Patient Request) No Script    QUESTIONS  Type of Appointment? Established Patient  Reason for appointment request? Available appointments did not meet   patient need  Additional Information for Provider? Patient is having some discharge and   abdominal pain, would like to get in with a PCP today.  ---------------------------------------------------------------------------  --------------  CALL BACK INFO  What is the best way for the office to contact you? OK to leave message on   voicemail  Preferred Call Back Phone Number? [phone]  ---------------------------------------------------------------------------  --------------  SCRIPT ANSWERS  Relationship to Patient? Self  (Is the patient requesting to see the provider for a procedure?)? No  (Is the patient requesting to see the provider urgently  today or   tomorrow. )? Yes  Have you been diagnosed with, awaiting test results for, or told that you   are suspected of having COVID-19 (Coronavirus)? (If patient has tested   negative or was tested as a requirement for work, school, or travel and   not based on symptoms, answer no)? No  Within the past 10 days have you developed any of the following symptoms   (answer no if symptoms have been present longer than 10 days or began   more than 10 days ago)? Fever or Chills, Cough, Shortness of breath or   difficulty breathing, Loss of taste or smell, Sore throat, Nasal   congestion, Sneezing or runny nose, Fatigue or generalized body aches   (answer no if pain is specific to a body part e.g. back pain), Diarrhea,   Headache? No  Have you had close contact with someone with COVID-19 in the last 7 days? No  (Service Expert  click yes below to proceed with Ansible As Usual   Scheduling)?  Yes

## 2022-02-24 NOTE — TELEPHONE ENCOUNTER
Received call from Melchor Asher at [facility] with [employer]. Subjective: Caller states \"vaginal pain, abd pain and bloody d/c\"     Current Symptoms: Started 2-3 days ago noticed increased vag d/c, then after 24 hours began to get pinked tinged,  Yesterday developed lower abdominal pain pain is dull ache but this am woke and felt like her uterus was going to fall out. Pain is constant and gradually gotten worse. Denies any urinary symptoms    Onset: 3 days ago; gradual, worsening    Associated Symptoms: NA    Pain Severity: 6/10; dull, aching, cramping; constant. Was 8/10 this am when she woke and sharp    Temperature: denies     What has been tried: tylenol    LMP: 2/6 thru 2/11 Pregnant: No    Recommended disposition: PCP/UCC today    Care advice provided, patient verbalizes understanding; denies any other questions or concerns; instructed to call back for any new or worsening symptoms. Patient/Caller agrees with recommended disposition; writer provided warm transfer to Kelsea Yousif at [facility] for appointment scheduling     Attention Provider: Thank you for allowing me to participate in the care of your patient. The patient was connected to triage in response to information provided to the ECC/PSC. Please do not respond through this encounter as the response is not directed to a shared pool.             Reason for Disposition   Unusual vaginal discharge    Protocols used: ABDOMINAL PAIN - [facility] - NEMO CARABALLO

## 2022-02-24 NOTE — TELEPHONE ENCOUNTER
Called patient and made her aware with the symptoms she is having post partum she needs to be seen by OB/GYN today. I provided her with Dr Amarjit North number [phone], the Dr that delivered her baby, but patient states that practice does not take her insurance. I strongly advised her to check with her insurance to find a Dr she could go to, and if could not get in with anyone today, she should go to ED today and then schedule f/u with OB/GYN as soon as possible.

## 2022-02-24 NOTE — TELEPHONE ENCOUNTER
I saw that this pt was scheduled with me today for a 15 minute appointment. C/o abdominal pain, bloody vaginal discharge and stated, \"I feel like my uterus is going to fall out. \"  She follows Dr. Oneill [place] with [facility] and is 4 months post partum.   She needs to call [facility] to be seen today by her OB/Gyn.

## (undated) DEVICE — GARMENT COMPR L FOR 23IN CALF FLOTRN

## (undated) DEVICE — 3M™ STERI-STRIP™ COMPOUND BENZOIN TINCTURE 40 BAGS/CARTON 4 CARTONS/CASE C1544: Brand: 3M™ STERI-STRIP™

## (undated) DEVICE — GLOVE ORANGE PI 7   MSG9070

## (undated) DEVICE — Device

## (undated) DEVICE — SUTURE MAXON 0 L36IN GRN ABSRB GS-24 L40MM 1/2 CIR REINF 8886628761

## (undated) DEVICE — TRAY URIN CATH 16FR DRNGE BG STATLOK STBL DEV F SURSTP

## (undated) DEVICE — SUTURE VCRL SZ 0 L36IN ABSRB UD L36MM CT-1 1/2 CIR J946H

## (undated) DEVICE — SUTURE VCRL SZ 3-0 L36IN ABSRB UD L36MM CT-1 1/2 CIR J944H

## (undated) DEVICE — ADHESIVE SKIN CLSR 0.7ML TOP DERMBND ADV

## (undated) DEVICE — DRESSING COMP IS W4XL10IN PD W2XL8IN CNTCT LAYR ADH

## (undated) DEVICE — SUTURE MCRYL SZ 0 L36IN ABSRB VLT CT L40MM 1/2 CIR TAPR PNT Y358H

## (undated) DEVICE — CHLORAPREP 26ML ORANGE

## (undated) DEVICE — BLADE CLIPPER GEN PURP NS

## (undated) DEVICE — PAD,NON-ADHERENT,3X8,STERILE,LF,1/PK: Brand: MEDLINE

## (undated) DEVICE — GLOVE SURG SZ 65 THK91MIL LTX FREE SYN POLYISOPRENE

## (undated) DEVICE — SOLUTION IV IRRIG POUR BRL 0.9% SODIUM CHL 2F7124